# Patient Record
Sex: MALE | Race: WHITE | URBAN - METROPOLITAN AREA
[De-identification: names, ages, dates, MRNs, and addresses within clinical notes are randomized per-mention and may not be internally consistent; named-entity substitution may affect disease eponyms.]

---

## 2021-10-21 ENCOUNTER — OFFICE VISIT (OUTPATIENT)
Dept: URBAN - METROPOLITAN AREA CLINIC 98 | Facility: CLINIC | Age: 31
End: 2021-10-21

## 2021-12-02 ENCOUNTER — WEB ENCOUNTER (OUTPATIENT)
Dept: URBAN - METROPOLITAN AREA CLINIC 98 | Facility: CLINIC | Age: 31
End: 2021-12-02

## 2021-12-02 ENCOUNTER — DASHBOARD ENCOUNTERS (OUTPATIENT)
Age: 31
End: 2021-12-02

## 2021-12-02 ENCOUNTER — OFFICE VISIT (OUTPATIENT)
Dept: URBAN - METROPOLITAN AREA CLINIC 98 | Facility: CLINIC | Age: 31
End: 2021-12-02
Payer: COMMERCIAL

## 2021-12-02 VITALS
TEMPERATURE: 97.1 F | SYSTOLIC BLOOD PRESSURE: 148 MMHG | BODY MASS INDEX: 28.64 KG/M2 | HEART RATE: 85 BPM | DIASTOLIC BLOOD PRESSURE: 90 MMHG | HEIGHT: 71 IN | WEIGHT: 204.6 LBS

## 2021-12-02 DIAGNOSIS — R79.89 ELEVATED LIVER FUNCTION TESTS: ICD-10-CM

## 2021-12-02 DIAGNOSIS — Z83.79 FAMILY HISTORY OF LIVER DISEASE: ICD-10-CM

## 2021-12-02 PROCEDURE — 99243 OFF/OP CNSLTJ NEW/EST LOW 30: CPT | Performed by: INTERNAL MEDICINE

## 2021-12-02 NOTE — HPI-TODAY'S VISIT:
Dr Bettie Barfield sent him here for high liver tests : a copy of this note will be sent to his attention Records pending from Dr Barfield.  Pt reports that 3 years ago - before COVID - liver tests were high  Then on recent labs, liver tests were elevated again.   . grandmother and brother with high liver tests as well During COVID did drink heavily- katey w recent breakup, Thanksgiving, [employer] winning the [employer].   Often alcoholic ex-girlfriend encourged him to drink as well  At peak : 30-50 drinks.  wine nightly- liquor on weekends.  h/o binge drinking in past. now 10-20 drinks / week otherwise feels well.  Has not been to alcohol recovery program.  No abdominal distension or fluid retention

## 2021-12-09 LAB
A/G RATIO: 1.9
A1A RFX TO PHENOTYPE: (no result)
AAT, DNA ANALYSIS: (no result)
ADDITIONAL INFORMATION:: (no result)
ALBUMIN: 5.4
ALKALINE PHOSPHATASE: 56
ALPHA-1-ANTITRYPSIN, SERUM: 115
ALT (SGPT): 312
AST (SGOT): 120
BASO (ABSOLUTE): 0.1
BASOS: 1
BILIRUBIN, TOTAL: 0.5
BUN/CREATININE RATIO: 15
BUN: 17
CALCIUM: 10.2
CARBON DIOXIDE, TOTAL: 23
CHLORIDE: 101
CREATININE: 1.1
EGFR IF AFRICN AM: 102
EGFR IF NONAFRICN AM: 88
EOS (ABSOLUTE): 0.1
EOS: 2
FERRITIN, SERUM: 426
GLOBULIN, TOTAL: 2.8
GLUCOSE: 108
HBSAG SCREEN: NEGATIVE
HCV AB: 0.3
HEMATOCRIT: 46.5
HEMATOLOGY COMMENTS:: (no result)
HEMOGLOBIN: 15.8
HEP A AB, TOTAL: POSITIVE
HEP B CORE AB, TOT: NEGATIVE
HEPATITIS B SURF AB QUANT: 95.1
IMMATURE CELLS: (no result)
IMMATURE GRANS (ABS): 0
IMMATURE GRANULOCYTES: 0
INTERPRETATION:: (no result)
IRON BIND.CAP.(TIBC): 387
IRON SATURATION: 22
IRON: 84
LYMPHS (ABSOLUTE): 1.5
LYMPHS: 21
Lab: (no result)
MCH: 29.6
MCHC: 34
MCV: 87
MONOCYTES(ABSOLUTE): 0.6
MONOCYTES: 8
NEUTROPHILS (ABSOLUTE): 4.8
NEUTROPHILS: 68
NRBC: (no result)
PLATELETS: 306
POTASSIUM: 4.6
PROTEIN, TOTAL: 8.2
RBC: 5.34
RDW: 13.5
SODIUM: 139
UIBC: 303
WBC: 7

## 2021-12-13 ENCOUNTER — TELEPHONE ENCOUNTER (OUTPATIENT)
Dept: URBAN - METROPOLITAN AREA CLINIC 98 | Facility: CLINIC | Age: 31
End: 2021-12-13

## 2021-12-28 ENCOUNTER — OFFICE VISIT (OUTPATIENT)
Dept: URBAN - METROPOLITAN AREA CLINIC 97 | Facility: CLINIC | Age: 31
End: 2021-12-28

## 2022-01-07 ENCOUNTER — TELEPHONE ENCOUNTER (OUTPATIENT)
Dept: URBAN - METROPOLITAN AREA CLINIC 98 | Facility: CLINIC | Age: 32
End: 2022-01-07